# Patient Record
Sex: FEMALE | Race: WHITE | NOT HISPANIC OR LATINO | Employment: STUDENT | URBAN - METROPOLITAN AREA
[De-identification: names, ages, dates, MRNs, and addresses within clinical notes are randomized per-mention and may not be internally consistent; named-entity substitution may affect disease eponyms.]

---
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
## 2021-10-09 ENCOUNTER — HEALTH MAINTENANCE LETTER (OUTPATIENT)
Age: 21
End: 2021-10-09

## 2022-01-29 ENCOUNTER — HEALTH MAINTENANCE LETTER (OUTPATIENT)
Age: 22
End: 2022-01-29

## 2022-04-06 ENCOUNTER — TELEPHONE (OUTPATIENT)
Dept: FAMILY MEDICINE | Facility: CLINIC | Age: 22
End: 2022-04-06

## 2022-06-06 ENCOUNTER — LAB REQUISITION (OUTPATIENT)
Dept: LAB | Facility: CLINIC | Age: 22
End: 2022-06-06

## 2022-06-06 PROCEDURE — 86481 TB AG RESPONSE T-CELL SUSP: CPT | Performed by: INTERNAL MEDICINE

## 2022-06-07 LAB
GAMMA INTERFERON BACKGROUND BLD IA-ACNC: 0.01 IU/ML
M TB IFN-G BLD-IMP: NEGATIVE
M TB IFN-G CD4+ BCKGRND COR BLD-ACNC: 9.16 IU/ML
MITOGEN IGNF BCKGRD COR BLD-ACNC: 0 IU/ML
MITOGEN IGNF BCKGRD COR BLD-ACNC: 0.03 IU/ML
QUANTIFERON MITOGEN: 9.17 IU/ML
QUANTIFERON NIL TUBE: 0.01 IU/ML
QUANTIFERON TB1 TUBE: 0.01 IU/ML
QUANTIFERON TB2 TUBE: 0.04

## 2022-09-11 ENCOUNTER — HEALTH MAINTENANCE LETTER (OUTPATIENT)
Age: 22
End: 2022-09-11

## 2022-10-05 ENCOUNTER — NURSE TRIAGE (OUTPATIENT)
Dept: FAMILY MEDICINE | Facility: CLINIC | Age: 22
End: 2022-10-05

## 2022-10-05 ENCOUNTER — HOSPITAL ENCOUNTER (EMERGENCY)
Facility: CLINIC | Age: 22
End: 2022-10-05
Payer: COMMERCIAL

## 2022-10-05 NOTE — TELEPHONE ENCOUNTER
"Patient advised to seek care evaluation in ER immediately, dad to drive her to nearest ER.  See Assessment questions and answers below.  Likely dehydrated, passed out in bathroom this morning. Lost vision and hearing at once and blacked out. Layed on bathroom floor. Did not hit head or injure self, patient not sure how long was out but feels was only few seconds.  Severe abdominal pain, LLQ. Also noting some symptoms that are similar to COVID. Has not done a home test as of yet.  Very little oral intake, diarrhea, bitter and strange taste in mouth constantly, body aches, sore throat , nausea, fevers, doubling over abdominal pain, lightheaded and dizzy, weakness.  Patient agreed with plan, will have dad drive her to ER now.      Sara Leo RN  [facility]        Reason for Disposition    SEVERE abdominal pain (e.g., excruciating) and present > 1 hour    Constant abdominal pain lasting > 2 hours    Drinking very little and has signs of dehydration (e.g., no urine > 12 hours, very dry mouth, very lightheaded)    Patient sounds very sick or weak to the triager    Additional Information    Negative: Shock suspected (e.g., cold/pale/clammy skin, too weak to stand, low BP, rapid pulse)    Negative: Difficult to awaken or acting confused (e.g., disoriented, slurred speech)    Negative: Sounds like a life-threatening emergency to the triager    Negative: Vomiting also present and worse than the diarrhea    Negative: Blood in stool and without diarrhea    Negative: Bloody, black, or tarry bowel movements (Exception: chronic-unchanged black-grey bowel movements and is taking iron pills or Pepto-Bismol)    Answer Assessment - Initial Assessment Questions  1. DIARRHEA SEVERITY: \"How bad is the diarrhea?\" \"How many more stools have you had in the past 24 hours than normal?\"     - NO DIARRHEA (SCALE 0)    - MILD (SCALE 1-3): Few loose or mushy BMs; increase of 1-3 stools over normal daily number " "of stools; mild increase in ostomy output.    -  MODERATE (SCALE 4-7): Increase of 4-6 stools daily over normal; moderate increase in ostomy output.  * SEVERE (SCALE 8-10; OR 'WORST POSSIBLE'): Increase of 7 or more stools daily over normal; moderate increase in ostomy output; incontinence.      Moderate, very liquid/watery  2. ONSET: \"When did the diarrhea begin?\"       Today is day 3  3. BM CONSISTENCY: \"How loose or watery is the diarrhea?\"       Very loose  4. VOMITING: \"Are you also vomiting?\" If Yes, ask: \"How many times in the past 24 hours?\"       No vomiting  5. ABDOMINAL PAIN: \"Are you having any abdominal pain?\" If Yes, ask: \"What does it feel like?\" (e.g., crampy, dull, intermittent, constant)       Yes, sharp pain in LLQ. Comes and goes, varies with diarrhea onset and movement  6. ABDOMINAL PAIN SEVERITY: If present, ask: \"How bad is the pain?\"  (e.g., Scale 1-10; mild, moderate, or severe)    - MILD (1-3): doesn't interfere with normal activities, abdomen soft and not tender to touch     - MODERATE (4-7): interferes with normal activities or awakens from sleep, abdomen tender to touch     - SEVERE (8-10): excruciating pain, doubled over, unable to do any normal activities        Ranges from mild to severe. Is doubled over, excruciating when has sudden and sharp pain and usually needs to run to the bathroom and has diarrhea  7. ORAL INTAKE: If vomiting, \"Have you been able to drink liquids?\" \"How much liquids have you had in the past 24 hours?\"      Very little oral intake, has only had about 6-8 ounces of water since yesterday  8. HYDRATION: \"Any signs of dehydration?\" (e.g., dry mouth [not just dry lips], too weak to stand, dizziness, new weight loss) \"When did you last urinate?\"      Yes, weakness, dry mouth, lightheadedness.   9. EXPOSURE: \"Have you traveled to a foreign country recently?\" \"Have you been exposed to anyone with diarrhea?\" \"Could you have eaten any food that was spoiled?\"      No " "travel or known exposures  10. ANTIBIOTIC USE: \"Are you taking antibiotics now or have you taken antibiotics in the past 2 months?\"        No  11. OTHER SYMPTOMS: \"Do you have any other symptoms?\" (e.g., fever, blood in stool)        Fevers, 101.7-97.7, by temporal scanner. No blood in stool. Body aches since Monday, nausea, abdominal pain, diarrhea, very bitter taste to everything in mouth, lightheadedness. *Had an episode of passing out this morning in the bathroom. Hearing and vision suddenly went out, layed down on floor of bathroom, passed out for few seconds and came back to. Weakness that comes and goes, sore throat, hurts to swallow.   12. PREGNANCY: \"Is there any chance you are pregnant?\" \"When was your last menstrual period?\"        No, LMP was 9/19/22    Protocols used: DIARRHEA-A-OH      "

## 2022-10-14 ENCOUNTER — OFFICE VISIT (OUTPATIENT)
Dept: FAMILY MEDICINE | Facility: CLINIC | Age: 22
End: 2022-10-14
Payer: COMMERCIAL

## 2022-10-14 VITALS
SYSTOLIC BLOOD PRESSURE: 124 MMHG | TEMPERATURE: 97.9 F | DIASTOLIC BLOOD PRESSURE: 82 MMHG | WEIGHT: 132.6 LBS | HEIGHT: 68 IN | HEART RATE: 80 BPM | BODY MASS INDEX: 20.1 KG/M2 | OXYGEN SATURATION: 98 % | RESPIRATION RATE: 20 BRPM

## 2022-10-14 DIAGNOSIS — R42 LIGHTHEADEDNESS: ICD-10-CM

## 2022-10-14 DIAGNOSIS — Z13.220 SCREENING FOR HYPERLIPIDEMIA: ICD-10-CM

## 2022-10-14 DIAGNOSIS — Z13.1 SCREENING FOR DIABETES MELLITUS: ICD-10-CM

## 2022-10-14 DIAGNOSIS — F33.1 MODERATE RECURRENT MAJOR DEPRESSION (H): ICD-10-CM

## 2022-10-14 DIAGNOSIS — Z00.00 ROUTINE GENERAL MEDICAL EXAMINATION AT A HEALTH CARE FACILITY: Primary | ICD-10-CM

## 2022-10-14 DIAGNOSIS — Z78.9 TRANSGENDER: ICD-10-CM

## 2022-10-14 DIAGNOSIS — R53.83 FATIGUE, UNSPECIFIED TYPE: ICD-10-CM

## 2022-10-14 DIAGNOSIS — F90.9 ATTENTION DEFICIT HYPERACTIVITY DISORDER (ADHD), UNSPECIFIED ADHD TYPE: ICD-10-CM

## 2022-10-14 LAB
ALBUMIN SERPL-MCNC: 4 G/DL (ref 3.4–5)
ALP SERPL-CCNC: 56 U/L (ref 40–150)
ALT SERPL W P-5'-P-CCNC: 26 U/L (ref 0–50)
ANION GAP SERPL CALCULATED.3IONS-SCNC: 4 MMOL/L (ref 3–14)
AST SERPL W P-5'-P-CCNC: 19 U/L (ref 0–45)
BASOPHILS # BLD AUTO: 0 10E3/UL (ref 0–0.2)
BASOPHILS NFR BLD AUTO: 1 %
BILIRUB SERPL-MCNC: 0.5 MG/DL (ref 0.2–1.3)
BUN SERPL-MCNC: 11 MG/DL (ref 7–30)
CALCIUM SERPL-MCNC: 8.8 MG/DL (ref 8.5–10.1)
CHLORIDE BLD-SCNC: 105 MMOL/L (ref 94–109)
CHOLEST SERPL-MCNC: 126 MG/DL
CO2 SERPL-SCNC: 29 MMOL/L (ref 20–32)
CREAT SERPL-MCNC: 0.73 MG/DL (ref 0.52–1.04)
EOSINOPHIL # BLD AUTO: 0.2 10E3/UL (ref 0–0.7)
EOSINOPHIL NFR BLD AUTO: 5 %
ERYTHROCYTE [DISTWIDTH] IN BLOOD BY AUTOMATED COUNT: 12.7 % (ref 10–15)
FASTING STATUS PATIENT QL REPORTED: NO
GFR SERPL CREATININE-BSD FRML MDRD: >90 ML/MIN/1.73M2
GLUCOSE BLD-MCNC: 112 MG/DL (ref 70–99)
HCT VFR BLD AUTO: 41.1 % (ref 35–47)
HDLC SERPL-MCNC: 39 MG/DL
HGB BLD-MCNC: 13.2 G/DL (ref 11.7–15.7)
IMM GRANULOCYTES # BLD: 0 10E3/UL
IMM GRANULOCYTES NFR BLD: 0 %
LDLC SERPL CALC-MCNC: 52 MG/DL
LYMPHOCYTES # BLD AUTO: 1.2 10E3/UL (ref 0.8–5.3)
LYMPHOCYTES NFR BLD AUTO: 26 %
MCH RBC QN AUTO: 27.8 PG (ref 26.5–33)
MCHC RBC AUTO-ENTMCNC: 32.1 G/DL (ref 31.5–36.5)
MCV RBC AUTO: 87 FL (ref 78–100)
MONOCYTES # BLD AUTO: 0.3 10E3/UL (ref 0–1.3)
MONOCYTES NFR BLD AUTO: 7 %
NEUTROPHILS # BLD AUTO: 2.9 10E3/UL (ref 1.6–8.3)
NEUTROPHILS NFR BLD AUTO: 61 %
NONHDLC SERPL-MCNC: 87 MG/DL
PLATELET # BLD AUTO: 228 10E3/UL (ref 150–450)
POTASSIUM BLD-SCNC: 4.4 MMOL/L (ref 3.4–5.3)
PROT SERPL-MCNC: 7.6 G/DL (ref 6.8–8.8)
RBC # BLD AUTO: 4.75 10E6/UL (ref 3.8–5.2)
SODIUM SERPL-SCNC: 138 MMOL/L (ref 133–144)
TRIGL SERPL-MCNC: 173 MG/DL
TSH SERPL DL<=0.005 MIU/L-ACNC: 0.73 MU/L (ref 0.4–4)
WBC # BLD AUTO: 4.7 10E3/UL (ref 4–11)

## 2022-10-14 PROCEDURE — 90471 IMMUNIZATION ADMIN: CPT | Performed by: PHYSICIAN ASSISTANT

## 2022-10-14 PROCEDURE — 90686 IIV4 VACC NO PRSV 0.5 ML IM: CPT | Performed by: PHYSICIAN ASSISTANT

## 2022-10-14 PROCEDURE — 99214 OFFICE O/P EST MOD 30 MIN: CPT | Mod: 25 | Performed by: PHYSICIAN ASSISTANT

## 2022-10-14 PROCEDURE — 36415 COLL VENOUS BLD VENIPUNCTURE: CPT | Performed by: PHYSICIAN ASSISTANT

## 2022-10-14 PROCEDURE — 80050 GENERAL HEALTH PANEL: CPT | Performed by: PHYSICIAN ASSISTANT

## 2022-10-14 PROCEDURE — 80061 LIPID PANEL: CPT | Performed by: PHYSICIAN ASSISTANT

## 2022-10-14 PROCEDURE — 99395 PREV VISIT EST AGE 18-39: CPT | Mod: 25 | Performed by: PHYSICIAN ASSISTANT

## 2022-10-14 ASSESSMENT — ENCOUNTER SYMPTOMS
BREAST MASS: 0
ABDOMINAL PAIN: 1
FEVER: 0
CHILLS: 0
PALPITATIONS: 0
CONSTIPATION: 0
ARTHRALGIAS: 1
SORE THROAT: 0
HEARTBURN: 0
DIARRHEA: 0
JOINT SWELLING: 0
DYSURIA: 0
HEMATOCHEZIA: 0
MYALGIAS: 0
NAUSEA: 0
HEMATURIA: 0
SHORTNESS OF BREATH: 0
PARESTHESIAS: 1
DIZZINESS: 1
COUGH: 0
FREQUENCY: 0
WEAKNESS: 0
HEADACHES: 1
EYE PAIN: 1
NERVOUS/ANXIOUS: 0

## 2022-10-14 ASSESSMENT — PATIENT HEALTH QUESTIONNAIRE - PHQ9
10. IF YOU CHECKED OFF ANY PROBLEMS, HOW DIFFICULT HAVE THESE PROBLEMS MADE IT FOR YOU TO DO YOUR WORK, TAKE CARE OF THINGS AT HOME, OR GET ALONG WITH OTHER PEOPLE: SOMEWHAT DIFFICULT
SUM OF ALL RESPONSES TO PHQ QUESTIONS 1-9: 11
SUM OF ALL RESPONSES TO PHQ QUESTIONS 1-9: 11

## 2022-10-14 ASSESSMENT — PAIN SCALES - GENERAL: PAINLEVEL: MILD PAIN (3)

## 2022-10-14 NOTE — PATIENT INSTRUCTIONS
Follow up with Dr. Esteban at [facility] ([phone]) formerly called [facility] for gynecology issues.   Follow up with sexual health clinic  I will notify you of lab results via [facility]         Preventive Health Recommendations  Female Ages 21 to 25     Yearly exam:   See your health care provider every year in order to  Review health changes.   Discuss preventive care.    Review your medicines if your doctor has prescribed any.    You should be tested each year for STDs (sexually transmitted diseases).     Talk to your provider about how often you should have cholesterol testing.    Get a Pap test every three years. If you have an abnormal result, your doctor may have you test more often.    If you are at risk for diabetes, you should have a diabetes test (fasting glucose).     Shots:   Get a flu shot each year.   Get a tetanus shot every 10 years.   Consider getting the shot (vaccine) that prevents cervical cancer (Gardasil).    Nutrition:   Eat at least 5 servings of fruits and vegetables each day.  Eat whole-grain bread, whole-wheat pasta and brown rice instead of white grains and rice.  Get adequate Calcium and Vitamin D.     Lifestyle  Exercise at least 150 minutes a week each week (30 minutes a day, 5 days a week). This will help you control your weight and prevent disease.  Limit alcohol to one drink per day.  No smoking.   Wear sunscreen to prevent skin cancer.  See your dentist every six months for an exam and cleaning.  The numbers below are emergency phone numbers in the case of a mental health crisis.      [facility]:   24/7 Suicide Hotline - 1-804.500.3359  Non-emergent Mental Health Hotline - 1-138.752.9426  www.[facility].org  Mental Health Crisis for [facility]:  Adults, 18 and older  COPE -- [phone]   Children, ages 17 and younger  Child Crisis -- [phone]    [facility]   Appointments [phone]  After Hours Crisis  [phone]    Mobile  "Crisis Response Team  [facility] Adult [phone]  [facility] Child [phone]  [facility] [phone]    [facility]  [phone]  [facility] [address] [address]    Crisis Text Line  Text [place] to 148798  Text \"Life to 65735 (12 pm - 3 am)    National Suicide Prevention Lifeline  Text 988 for Suicide and Crisis Lifeline   1-805.449.8445  Veterans' service, option 1       "

## 2022-10-14 NOTE — PROGRESS NOTES
SUBJECTIVE:   CC: Tami is an 22 year old who presents for preventive health visit.     Patient has been advised of split billing requirements and indicates understanding: Yes     Healthy Habits:     Getting at least 3 servings of Calcium per day:  NO    Bi-annual eye exam:  Yes    Dental care twice a year:  Yes    Sleep apnea or symptoms of sleep apnea:  Daytime drowsiness    Diet:  Regular (no restrictions)    Duration of exercise:  Other    Taking medications regularly:  Yes    Medication side effects:  None    PHQ-2 Total Score: 3    Additional concerns today:  Yes              Today's PHQ-2 Score:   PHQ-2 ( 1999 Pfizer) 10/14/2022   Q1: Little interest or pleasure in doing things 2   Q2: Feeling down, depressed or hopeless 1   PHQ-2 Score 3   PHQ-2 Total Score (12-17 Years)- Positive if 3 or more points; Administer PHQ-A if positive -   Q1: Little interest or pleasure in doing things More than half the days   Q2: Feeling down, depressed or hopeless Several days   PHQ-2 Score 3       Abuse: Current or Past (Physical, Sexual or Emotional) - No  Do you feel safe in your environment? Yes    Have you ever done Advance Care Planning? (For example, a Health Directive, POLST, or a discussion with a medical provider or your loved ones about your wishes): No, advance care planning information given to patient to review.  Patient declined advance care planning discussion at this time.    Social History     Tobacco Use     Smoking status: Never     Smokeless tobacco: Never   Substance Use Topics     Alcohol use: No     If you drink alcohol do you typically have >3 drinks per day or >7 drinks per week? No    Alcohol Use 10/14/2022   Prescreen: >3 drinks/day or >7 drinks/week? Not Applicable       Reviewed orders with patient.  Reviewed health maintenance and updated orders accordingly - Yes  BP Readings from Last 3 Encounters:   10/14/22 124/82   01/17/19 123/78 (86 %, Z = 1.08 /  91 %, Z = 1.34)*   11/06/17 128/65 (95  %, Z = 1.64 /  44 %, Z = -0.15)*     *BP percentiles are based on the 2017 AAP Clinical Practice Guideline for girls    Wt Readings from Last 3 Encounters:   10/14/22 60.1 kg (132 lb 9.6 oz)   01/17/19 59.6 kg (131 lb 8 oz) (66 %, Z= 0.41)*   11/06/17 60.5 kg (133 lb 6.4 oz) (72 %, Z= 0.60)*     * Growth percentiles are based on [facility] (Girls, 2-20 Years) data.                  Patient Active Problem List   Diagnosis     Seasonal allergic rhinitis     Anxiety disorder     Eczema of face     Past Surgical History:   Procedure Laterality Date     AS RAD RESEC TONSIL/PILLARS       WISDOM TOOTH EXTRACTION         Social History     Tobacco Use     Smoking status: Never     Passive exposure: Never     Smokeless tobacco: Never   Substance Use Topics     Alcohol use: No     Family History   Problem Relation Age of Onset     Anxiety Disorder Mother      Attention Deficit Disorder Mother      Attention Deficit Disorder Father      Anxiety Disorder Father      Migraines Sister      Anxiety Disorder Sister      Depression Sister      Attention Deficit Disorder Sister      Anxiety Disorder Sister      Depression Sister      Attention Deficit Disorder Sister      Coronary Artery Disease Maternal Grandfather      Cerebrovascular Disease Maternal Grandfather      Abdominal Aortic Aneurysm Paternal Grandmother      Coronary Artery Disease Paternal Grandmother      Hyperlipidemia Paternal Grandfather      Breast Cancer No family hx of      Colon Cancer No family hx of          Current Outpatient Medications   Medication Sig Dispense Refill     cetirizine (ZYRTEC) 10 MG tablet Take 10 mg by mouth daily         Breast Cancer Screening:        History of abnormal Pap smear: NO - age 21-29 PAP every 3 years recommended     Reviewed and updated as needed this visit by clinical staff   Tobacco  Allergies  Meds   Med Hx  Surg Hx  Fam Hx  Soc Hx        Reviewed and updated as needed this visit by Provider   Tobacco   Meds   Med Hx   Surg Hx  Fam Hx  Soc Hx       Patient unknown to me presents for physical   Positive for covid week ago Wednesday   Works at [employer]   Eye pain with headache . Most of my headache are in eyes and back of head.  Has had headaches as long as she can remember . Doesn't take anything for headaches  Try not to drive with headache because hard to see because light is so bright.  Gets headache almost every day.  But those aren't too bad.  Mostly a pressure.    Not tried anything for headache- doesn't want to take med everyday- try not to take too many medications.   My worries about lightheadedness recently.  About a month ago had to stay at home for a week because lightheaded and felt like going to pass out.  Worse With covid   Good water drinker. Not drinking as much with covid  Little lightheaded now.  Not lost taste of smell but everything bitter for awhile better  Did have fever.  Still have a slight cough. Nonproductive ocugh  Shortness of breath at times.  With lightheadedness.no history of dvt or PE   No chest pain .  Lightheaded on and off  arthraligas with covid but left hip always kind of hurts.  Mood is alright.  I am depressed.  See psychiatrist next week -seeing him for four years .  Abdominal pain with periods.  Regular- came week early this month - currently menstruating.  Reports is transgender and wants hysterectomy. Has dysmenorrhea and doesn't want to take oral contraceptive pill   Only taking antihistamine   Not taking any psychiagry medications at present - fleeting thoughts of harming self   Most of time would attribute it to anxiety or super hot- overheat pretty easily  Usually exercise - run - no recent exercise due to covid.  covid symptoms started 12 days ago   Dad had covid- he is an OR nurse  Also transgender - not asking for referral right now since not out to most of people but interested in hormone therapy  Has never been sexually active - doesn't  "use tampons -   Review of Systems   Constitutional: Negative for chills and fever.   HENT: Negative for congestion, ear pain, hearing loss and sore throat.    Eyes: Positive for pain. Negative for visual disturbance.   Respiratory: Negative for cough and shortness of breath.    Cardiovascular: Negative for chest pain, palpitations and peripheral edema.   Gastrointestinal: Positive for abdominal pain. Negative for constipation, diarrhea, heartburn, hematochezia and nausea.   Breasts:  Negative for tenderness, breast mass and discharge.   Genitourinary: Negative for dysuria, frequency, genital sores, hematuria, pelvic pain, urgency, vaginal bleeding and vaginal discharge.   Musculoskeletal: Positive for arthralgias. Negative for joint swelling and myalgias.   Skin: Negative for rash.   Neurological: Positive for dizziness, headaches and paresthesias. Negative for weakness.   Psychiatric/Behavioral: Negative for mood changes. The patient is not nervous/anxious.           OBJECTIVE:   /82   Pulse 80   Temp 97.9  F (36.6  C) (Temporal)   Resp 20   Ht 1.721 m (5' 7.75\")   Wt 60.1 kg (132 lb 9.6 oz)   LMP 10/13/2022 (Exact Date)   SpO2 98%   BMI 20.31 kg/m    Physical Exam  GENERAL: alert, no distress and frail  EYES: Eyes grossly normal to inspection, PERRL and conjunctivae and sclerae normal  HENT: ear canals and TM's normal, nose and mouth without ulcers or lesions  NECK: no adenopathy, no asymmetry, masses, or scars and thyroid normal to palpation  RESP: lungs clear to auscultation - no rales, rhonchi or wheezes  BREAST: deferred  CV: regular rate and rhythm, normal S1 S2, no S3 or S4, no murmur, click or rub, no peripheral edema and peripheral pulses strong  ABDOMEN: soft, nontender, no hepatosplenomegaly, no masses and bowel sounds normal   (female): deferred- currently menstruating and never sexually active   MS: no gross musculoskeletal defects noted, no edema  SKIN: no suspicious lesions or " leda  NEURO: Normal strength and tone, mentation intact and speech normal  PSYCH: mentation appears normal, affect normal/bright    Diagnostic Test Results:  Results for orders placed or performed in visit on 10/14/22   Comprehensive metabolic panel (BMP + Alb, Alk Phos, ALT, AST, Total. Bili, TP)     Status: Abnormal   Result Value Ref Range    Sodium 138 133 - 144 mmol/L    Potassium 4.4 3.4 - 5.3 mmol/L    Chloride 105 94 - 109 mmol/L    Carbon Dioxide (CO2) 29 20 - 32 mmol/L    Anion Gap 4 3 - 14 mmol/L    Urea Nitrogen 11 7 - 30 mg/dL    Creatinine 0.73 0.52 - 1.04 mg/dL    Calcium 8.8 8.5 - 10.1 mg/dL    Glucose 112 (H) 70 - 99 mg/dL    Alkaline Phosphatase 56 40 - 150 U/L    AST 19 0 - 45 U/L    ALT 26 0 - 50 U/L    Protein Total 7.6 6.8 - 8.8 g/dL    Albumin 4.0 3.4 - 5.0 g/dL    Bilirubin Total 0.5 0.2 - 1.3 mg/dL    GFR Estimate >90 >60 mL/min/1.73m2   Lipid panel reflex to direct LDL Fasting     Status: Abnormal   Result Value Ref Range    Cholesterol 126 <200 mg/dL    Triglycerides 173 (H) <150 mg/dL    Direct Measure HDL 39 (L) >=50 mg/dL    LDL Cholesterol Calculated 52 <=100 mg/dL    Non HDL Cholesterol 87 <130 mg/dL    Patient Fasting > 8hrs? No     Narrative    Cholesterol  Desirable:  <200 mg/dL    Triglycerides  Normal:  Less than 150 mg/dL  Borderline High:  150-199 mg/dL  High:  200-499 mg/dL  Very High:  Greater than or equal to 500 mg/dL    Direct Measure HDL  Female:  Greater than or equal to 50 mg/dL   Male:  Greater than or equal to 40 mg/dL    LDL Cholesterol  Desirable:  <100mg/dL  Above Desirable:  100-129 mg/dL   Borderline High:  130-159 mg/dL   High:  160-189 mg/dL   Very High:  >= 190 mg/dL    Non HDL Cholesterol  Desirable:  130 mg/dL  Above Desirable:  130-159 mg/dL  Borderline High:  160-189 mg/dL  High:  190-219 mg/dL  Very High:  Greater than or equal to 220 mg/dL   TSH with free T4 reflex     Status: Normal   Result Value Ref Range    TSH 0.73 0.40 - 4.00 mU/L   CBC with  platelets and differential     Status: None   Result Value Ref Range    WBC Count 4.7 4.0 - 11.0 10e3/uL    RBC Count 4.75 3.80 - 5.20 10e6/uL    Hemoglobin 13.2 11.7 - 15.7 g/dL    Hematocrit 41.1 35.0 - 47.0 %    MCV 87 78 - 100 fL    MCH 27.8 26.5 - 33.0 pg    MCHC 32.1 31.5 - 36.5 g/dL    RDW 12.7 10.0 - 15.0 %    Platelet Count 228 150 - 450 10e3/uL    % Neutrophils 61 %    % Lymphocytes 26 %    % Monocytes 7 %    % Eosinophils 5 %    % Basophils 1 %    % Immature Granulocytes 0 %    Absolute Neutrophils 2.9 1.6 - 8.3 10e3/uL    Absolute Lymphocytes 1.2 0.8 - 5.3 10e3/uL    Absolute Monocytes 0.3 0.0 - 1.3 10e3/uL    Absolute Eosinophils 0.2 0.0 - 0.7 10e3/uL    Absolute Basophils 0.0 0.0 - 0.2 10e3/uL    Absolute Immature Granulocytes 0.0 <=0.4 10e3/uL   CBC with platelets and differential     Status: None    Narrative    The following orders were created for panel order CBC with platelets and differential.  Procedure                               Abnormality         Status                     ---------                               -----------         ------                     CBC with platelets and d...[[id number]]                      Final result                 Please view results for these tests on the individual orders.       ASSESSMENT/PLAN:   (Z00.00) Routine general medical examination at a health care facility  (primary encounter diagnosis)  Comment: benign exam  Plan:     (R42) Lightheadedness  Comment: rule out anemia and thyroid disease- also check electrolytes   Plan: Comprehensive metabolic panel (BMP + Alb, Alk         Phos, ALT, AST, Total. Bili, TP), Lipid panel         reflex to direct LDL Fasting, CBC with         platelets and differential, TSH with free T4         reflex            (R53.83) Fatigue, unspecified type  Comment: rule out anemia and thyroid disease as well as electrolytes and kidney and liver function  Plan: Comprehensive metabolic panel (BMP + Alb, Alk         Phos, ALT,  "AST, Total. Bili, TP), CBC with         platelets and differential, TSH with free T4         reflex            (Z13.220) Screening for hyperlipidemia  Comment:   Plan: Lipid panel reflex to direct LDL Fasting            (Z13.1) Screening for diabetes mellitus  Comment:   Plan: Comprehensive metabolic panel (BMP + Alb, Alk         Phos, ALT, AST, Total. Bili, TP)            (Z78.9) Transgender  Comment: interested in hormone replacement.  Interested in hysterectomy- follow up with [facility] sexual health and obgyn   Plan: [facility]  Referral            (F90.9) Attention deficit hyperactivity disorder (ADHD), unspecified ADHD type  Comment: followed by psychiatry  Plan:     (F33.1) Moderate recurrent major depression (H)  Comment: followed by psychiatry.  Fleeting thoughts of harming self.  Can keep self safe.  Not currently taking medication.  Crisis numbers provided in AVS   Plan:     Patient has been advised of split billing requirements and indicates understanding: Yes    COUNSELING:  Reviewed preventive health counseling, as reflected in patient instructions       Regular exercise       Healthy diet/nutrition       Vision screening       Immunizations    Vaccinated for: Influenza          Estimated body mass index is 20.6 kg/m  as calculated from the following:    Height as of 1/17/19: 1.702 m (5' 7\").    Weight as of 1/17/19: 59.6 kg (131 lb 8 oz).  Wt Readings from Last 4 Encounters:   10/14/22 60.1 kg (132 lb 9.6 oz)   01/17/19 59.6 kg (131 lb 8 oz) (66 %, Z= 0.41)*   11/06/17 60.5 kg (133 lb 6.4 oz) (72 %, Z= 0.60)*   01/15/17 59 kg (130 lb) (71 %, Z= 0.57)*     * Growth percentiles are based on CDC (Girls, 2-20 Years) data.       Weight management plan noted, stable and monitoring    She reports that she has never smoked. She has never used smokeless tobacco.      Counseling Resources:  ATP IV Guidelines  Pooled Cohorts Equation Calculator  Breast Cancer Risk Calculator  BRCA-Related " Cancer Risk Assessment: FHS-7 Tool  FRAX Risk Assessment  ICSI Preventive Guidelines  Dietary Guidelines for Americans, 2010  USDA's MyPlate  ASA Prophylaxis  Lung CA Screening    Liset Holland PA-C  [facility]  Answers for HPI/ROS submitted by the patient on 10/14/2022  If you checked off any problems, how difficult have these problems made it for you to do your work, take care of things at home, or get along with other people?: Somewhat difficult  PHQ9 TOTAL SCORE: 11

## 2022-10-15 PROBLEM — F90.9 ATTENTION DEFICIT HYPERACTIVITY DISORDER (ADHD), UNSPECIFIED ADHD TYPE: Status: ACTIVE | Noted: 2022-10-15

## 2022-10-15 PROBLEM — F33.1 MODERATE RECURRENT MAJOR DEPRESSION (H): Status: ACTIVE | Noted: 2022-10-15

## 2022-10-15 NOTE — RESULT ENCOUNTER NOTE
Dear Tami  Your thyroid function was normal.  Your electrolytes, kidney function and liver function were normal.     Your kidney function was marginally elevated but not concerning since it was not a fasting specimen.  Your triglycerides were a little high but not concerning since it was not a fasting specimen.  Your total cholesterol, HDL (good cholesterol) and LDL (bad cholesterol) were normal.   Please call or MyChart my office with any questions or concerns.   Liset Holland, PAC

## 2022-11-16 ENCOUNTER — ALLIED HEALTH/NURSE VISIT (OUTPATIENT)
Dept: FAMILY MEDICINE | Facility: CLINIC | Age: 22
End: 2022-11-16
Payer: COMMERCIAL

## 2022-11-16 DIAGNOSIS — Z23 HIGH PRIORITY FOR 2019-NCOV VACCINE: Primary | ICD-10-CM

## 2022-11-16 PROCEDURE — 91313 COVID-19,PF,MODERNA BIVALENT: CPT

## 2022-11-16 PROCEDURE — 99207 PR NO CHARGE NURSE ONLY: CPT

## 2022-11-16 PROCEDURE — 0134A COVID-19,PF,MODERNA BIVALENT: CPT

## 2022-11-21 ENCOUNTER — OFFICE VISIT (OUTPATIENT)
Dept: URGENT CARE | Facility: URGENT CARE | Age: 22
End: 2022-11-21
Payer: COMMERCIAL

## 2022-11-21 VITALS
HEART RATE: 68 BPM | BODY MASS INDEX: 21.6 KG/M2 | SYSTOLIC BLOOD PRESSURE: 134 MMHG | WEIGHT: 137.6 LBS | OXYGEN SATURATION: 98 % | HEIGHT: 67 IN | TEMPERATURE: 98.5 F | DIASTOLIC BLOOD PRESSURE: 84 MMHG

## 2022-11-21 DIAGNOSIS — M25.551 HIP PAIN, RIGHT: ICD-10-CM

## 2022-11-21 DIAGNOSIS — M25.552 HIP PAIN, LEFT: ICD-10-CM

## 2022-11-21 DIAGNOSIS — M54.50 ACUTE BILATERAL LOW BACK PAIN WITHOUT SCIATICA: Primary | ICD-10-CM

## 2022-11-21 PROCEDURE — 99213 OFFICE O/P EST LOW 20 MIN: CPT | Performed by: FAMILY MEDICINE

## 2022-11-21 NOTE — PROGRESS NOTES
"Assessment & Plan     Acute bilateral low back pain without sciatica  Hip pain, left  Hip pain, right  Physical therapy recommended. Use of OTC  meds. discussed with ibuprofen/topicals.   - Physical Therapy Referral       [id number]}    Return in about 3 weeks (around 12/12/2022) for follow up with your regular clinic or sports medicine if needed.    Baljit Noonan MD  [facility]    ------------------------------------------------------------------------  Subjective     Tami Quintanilla presents to clinic today for the following health issues:  chief complaint  HPI  One month of bilaterally low back pain without radiation onset when she had covid. No injury nor overuse. Similar pain a few years ago that resolved spontaneoulsy. tyl does not help much. No numbness/tingling nor weakness.     Also some bilaterally hi pina but this has been off and on for a longer period of time. Some times worse when she is more active.       Review of Systems        Objective    /84 (BP Location: Left arm, Patient Position: Sitting, Cuff Size: Adult Regular)   Pulse 68   Temp 98.5  F (36.9  C) (Tympanic)   Ht 1.702 m (5' 7\")   Wt 62.4 kg (137 lb 9.6 oz)   LMP 10/13/2022 (Exact Date)   SpO2 98%   BMI 21.55 kg/m    Physical Exam   GENERAL: healthy, alert and no distress  MS: bilaterally hip with normal raom but pain laterally with hip range of motion.   Lumbosacral spine area reveals no local tenderness or mass but she is tenderness to palpation  bilaterally paraspinous muscles. Straight leg raise is negative bilaterally.   SKIN: no suspicious lesions or rashes  "

## 2023-09-14 ENCOUNTER — PATIENT OUTREACH (OUTPATIENT)
Dept: CARE COORDINATION | Facility: CLINIC | Age: 23
End: 2023-09-14

## 2023-09-28 ENCOUNTER — PATIENT OUTREACH (OUTPATIENT)
Dept: CARE COORDINATION | Facility: CLINIC | Age: 23
End: 2023-09-28

## 2023-12-16 ENCOUNTER — HEALTH MAINTENANCE LETTER (OUTPATIENT)
Age: 23
End: 2023-12-16

## 2025-01-12 ENCOUNTER — HEALTH MAINTENANCE LETTER (OUTPATIENT)
Age: 25
End: 2025-01-12